# Patient Record
Sex: FEMALE | Employment: FULL TIME | URBAN - METROPOLITAN AREA
[De-identification: names, ages, dates, MRNs, and addresses within clinical notes are randomized per-mention and may not be internally consistent; named-entity substitution may affect disease eponyms.]

---

## 2019-04-01 ENCOUNTER — HOSPITAL ENCOUNTER (OUTPATIENT)
Dept: PHYSICAL THERAPY | Age: 69
End: 2019-04-01
Payer: COMMERCIAL

## 2019-04-29 ENCOUNTER — HOSPITAL ENCOUNTER (OUTPATIENT)
Dept: PHYSICAL THERAPY | Age: 69
Discharge: HOME | End: 2019-04-29
Payer: COMMERCIAL

## 2019-04-29 PROCEDURE — 97162 PT EVAL MOD COMPLEX 30 MIN: CPT

## 2019-04-29 PROCEDURE — 97110 THERAPEUTIC EXERCISES: CPT

## 2019-04-29 NOTE — PROGRESS NOTES
Keesha Marsh 32  [facility] PHYSICAL THERAPY 
[address] [facility] [address], [place], [address] - Phone: [phone]  Fax: [phone] PLAN OF CARE / STATEMENT OF MEDICAL NECESSITY FOR PHYSICAL THERAPY SERVICES Patient Name: Madiha Scott : 1950 Medical  
Diagnosis: Right shoulder pain [M25.511] Treatment Diagnosis: Right shoulder pain Onset Date: DOS:2019 Referral Source: Man[facility]): 2019 Prior Hospitalization: See medical history Provider #: [id number] Prior Level of Function: Independent, sedentary Comorbidities: DM, HTN Medications: Verified on Patient Summary List  
The Plan of Care and following information is based on the information from the initial evaluation.  
=========================================================================================== Assessment / key information:  Pt is a 80year old female who presents to PT today with c/o right shoulder pain s/p right RTC repair on 2019. Pt verbalized understanding of her surgical precautions and reports her pain is managed well. Pt right shoulder PROM progressing well with empty end feel noted in all planes as she reaches the protocol ROM restrictions Pt would benefit from skilled PT to progress patient through rehab protocol and facilitate return to PLOF. Physical Therapy Evaluation - Shoulder 
  
Posture: forward held head, rounded shoulders moderate kyphotic posture 
  
C/S screen: WFL in all planes. \"stiffness with right rotation and left sidebend\" 
  
Palpation: Pt TTP along distal RTC attachment. 
  
Scapulohumoral Control / Rhythm: not observed 
  
ROM:   
                                         AROM/PROM 
  Left Right Flexion [facility] NT/90 Extension [facility] NT/20 Scaption/ABD [facility] NT/45 ER @ 0 Degrees [facility] NT/45 ER @ 90 Degrees WFL  NT/NT  
IR @ 90 Degrees T9 NT/NT  
  
- Empty end feel noted with all planes of PROM, mild pain with ER 
  
 Strength:   [x] Unable to assess at this time                                                                          
  L (1-5) R (1-5) Flexors 4-/5 NT Abductors 4-/5 NT External Rotators 4/5 NT Internal Rotators 4/5 NT Extension 4/5 NT  
  
 
=========================================================================================== Eval Complexity: History: MEDIUM  Complexity : 1-2 comorbidities / personal factors will impact the outcome/ POC Exam:MEDIUM Complexity : 3 Standardized tests and measures addressing body structure, function, activity limitation and / or participation in recreation  Presentation: MEDIUM Complexity : Evolving with changing characteristics  Clinical Decision Making:HIGH Complexity : FOTO score of 1- 25 Overall Complexity:MEDIUM 
 
FOTO score: 4: which indicates 96 % of functional disability. Problem List: pain affecting function, decrease ROM, decrease strength, edema affecting function, impaired gait/ balance, decrease ADL/ functional abilitiies, decrease activity tolerance, decrease flexibility/ joint mobility and decrease transfer abilities Treatment Plan may include any combination of the following: Therapeutic exercise, Therapeutic activities, Neuromuscular re-education, Physical agent/modality, Gait/balance training, Manual therapy, Aquatic therapy, Patient education, Self Care training, Functional mobility training, Home safety training and Stair training Patient / Family readiness to learn indicated by: asking questions, trying to perform skills and interest 
Persons(s) to be included in education: patient (P) Barriers to Learning/Limitations: None Measures taken: NA  
Patient Goal (s): Improve motion of the right arm Patient self reported health status: good Rehabilitation Potential: excellent ? Short Term Goals: To be accomplished in  2  weeks: 1. Pt will be compliant with HEP for symptom management at home. 2. Pt will increase right shoulder active assist flexion/abduction ROM to at least 120 deg in order to facilitate [place] ADL's. 
3. Pt will increase right shoulder active assist external rotation ROM to at least 65 deg in order to facilitate self grooming tasks ? Long Term Goals: To be accomplished in  4  weeks: 1. Pt will be independent with HEP at D/C for self management. 2. Pt will increase right shoulder flexion AROM to at least 135 deg in order to return to [facility] functional activities 3. Pt to increase FOTO score to > 49 to indicate improved functional independence. 4. Pt will increase right shoulder ER AROM to at least 80 deg in order to return to self grooming tasks. Frequency / Duration:   Patient to be seen  2-3  times per week for 4  weeks: 
Patient / Caregiver education and instruction: self care, activity modification and exercises Therapist Signature: Paras Fink PT Date: 4/29/2019 Certification Period: 04/29/2019-07/28/2019 Time: 9:19 AM  
=========================================================================================== I certify that the above Physical Therapy Services are being furnished while the patient is under my care. I agree with the treatment plan and certify that this therapy is necessary. Physician Signature:       Date:      Time:  Please sign and return to [employer] or you may fax the signed copy to [phone]. Thank you.

## 2019-04-29 NOTE — PROGRESS NOTES
PHYSICAL THERAPY - DAILY TREATMENT NOTE Patient Name: Luan Quarles        Date: 2019 : 1950   YES Patient  Verified Visit #:   1     Insurance: Payor: Ann Marie Whatley / Plan: [address] PT / Product Type: Commerical / In time: 04:19 Out time: 5:00 Total Treatment Time: 41 Medicare Time Tracking (below) Total Timed Codes (min):  NA 1:1 Treatment Time:  NA  
TREATMENT AREA = Right shoulder pain [M25.511] SUBJECTIVE Pain Level (on 0 to 10 scale):  1  / 10 Medication Changes/New allergies or changes in medical history, any new surgeries or procedures? NO    If yes, update Summary List  
Subjective Functional Status/Changes:  []  No changes reported CC:Right shoulder pain s/p right RTC repair on 2019 NEIDA/HPI:Pt is a RHD female who reports chronic right shoulder pain that significantly increased approx 6 months prior to surgery. Pt denies any clear NEIDA but states 2 different occasions where her dog has jerked her arm. Pt denies any numbness/tingling down right UE. Pt denies any complications post surgery. Pt f/u with MD on 2019 and he stated he was happy with progress thus far. MD D/C sling and cleared pt to drive. Symptoms Pain rating (0-10): Today:1/10 Best:1/10 Worst:1/10 Aggravating factors:wearing a bra,  
 
Relieving factors: Ice 
 
 
Pain/Difficulty with functional activities: 
[x] Yes [] No Reaching [facility] [x] Yes [] No Tucking in shirt 
[x] Yes [] No Lifting milk carton 
[x] Yes [] No Pushing/pulling 
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 OBJECTIVE Physical Therapy Evaluation - Shoulder Posture: forward held head, rounded shoulders moderate kyphotic posture C/S screen: WFL in all planes. \"stiffness with right rotation and left sidebend\" Palpation: Pt TTP along distal RTC attachment. Scapulohumoral Control / Rhythm: not observed ROM:   
 AROM/PROM Left Right Flexion [facility] NT/90 Extension [facility] NT/20 Scaption/ABD [facility] NT/45 ER @ 0 Degrees [facility] NT/45 ER @ 90 Degrees WFL  NT/NT  
IR @ 90 Degrees T9 NT/NT  
 
- Empty end feel noted with all planes of PROM, mild pain with ER Strength:   [x] Unable to assess at this time L (1-5) R (1-5) Flexors 4-/5 NT Abductors 4-/5 NT External Rotators 4/5 NT Internal Rotators 4/5 NT Extension 4/5 NT Optional Tests:  N/A Other Tests / Comments:  
 
Therapeutic Procedures: 
Min Procedure Specifics + Rationale  
n/a [x]  Patient Education (performed throughout session) [x] Review HEP [] Progressed/Changed HEP based on:  
[] proper performance and advancement of Therex/TA [] reduction in pain level   
[] increased functional capacity [] change in directional preference 15 [x] Therapeutic Exercise [x]  See Flowsheet Rationale: increase ROM and increase strength to improve the patients ability to participate in ADL's Other Objective/Functional Measures: 
 
See objective measurements above. PROM billed under TE Post Treatment Pain Level (on 0 to 10) scale:   1  / 10 ASSESSMENT Assessment/Changes in Function:See POC Justification for Eval Code Complexity: Moderate Patient History (low 0, mod 1-2, high 3-4): Moderate: chronic right shoulder pain requiring surgery Examination (low 1-2, mod 3+, high 4+): Moderate: decreased right shoulder A/PROM, decreased strength Clinical Presentation (low: stable/uncomplicated; mod: evolving; high: unstable/unpredictable): Moderate Clinical Decision Making (low , mod 26-74, high 1-25): High: FOTO: 4 [x]  See Plan of Care 
[]  See Progress Note/ Recertification 
[]  See Discharge Summary Patient will continue to benefit from skilled PT services to modify and progress therapeutic interventions, address functional mobility deficits, address ROM deficits, address strength deficits, analyze and address soft tissue restrictions, analyze and cue movement patterns, analyze and modify body mechanics/ergonomics, assess and modify postural abnormalities, address imbalance/dizziness and instruct in home and community integration  to attain remaining goals Progress toward goals / Updated goals: 
See POC PLAN [x]  Upgrade activities as tolerated 
[x]  Update interventions per flow sheet YES Continue plan of care  
[]  Discharge due to :   
[]  Other:   
 
Therapist: Sharlene Mauro DPT Date: 4/29/2019 Time: 9:25 AM  
 
Future Appointments Date Time Provider Heather Villagran 4/29/2019  4:00 PM Marjan Wallis [facility]  
5/2/2019  2:30 PM Danny Agarwal, [facility]  
5/7/2019  3:00 PM Danny Agarwal, [facility]  
5/9/2019  2:30 PM Brooks Bishop, [facility]

## 2019-05-07 ENCOUNTER — APPOINTMENT (OUTPATIENT)
Dept: PHYSICAL THERAPY | Age: 69
End: 2019-05-07
Payer: COMMERCIAL

## 2019-05-09 ENCOUNTER — HOSPITAL ENCOUNTER (OUTPATIENT)
Dept: PHYSICAL THERAPY | Age: 69
Discharge: HOME | End: 2019-05-09
Payer: COMMERCIAL

## 2019-05-09 PROCEDURE — 97110 THERAPEUTIC EXERCISES: CPT

## 2019-05-09 PROCEDURE — 97140 MANUAL THERAPY 1/> REGIONS: CPT

## 2019-05-09 NOTE — PROGRESS NOTES
PHYSICAL THERAPY - DAILY TREATMENT NOTE Patient Name: Eleni John        Date: 2019 : 1950   YES Patient  Verified Visit #:   2     Insurance: Payor: Kelly Corea / Plan: [address] PT / Product Type: Commerical / In time: 1555 Out time: 09 Total Treatment Time: 40 Medicare/BCBS [place] Time Tracking (below) Total Timed Codes (min):  NA 1:1 Treatment Time:  NA  
TREATMENT AREA =  Right shoulder pain [M25.511] SUBJECTIVE Pain Level (on 0 to 10 scale): 1  / 10 Medication Changes/New allergies or changes in medical history, any new surgeries or procedures? NO    If yes, update Summary List  
Subjective Functional Status/Changes:  []  No changes reported \"It's a little sore. \" OBJECTIVE Therapeutic Procedures: 
Min Procedure Specifics + Rationale  
n/a [x]  Patient Education (performed throughout session) [x] Review HEP [] Progressed/Changed HEP based on:  
[] proper performance and advancement of Therex/TA [] reduction in pain level   
[] increased functional capacity [] change in directional preference 25 [x] Therapeutic Exercise [x]  See Flowsheet Rationale: increase ROM and increase strength to improve the patients ability to participate in ADL's   
15 [x]  Manual Therapy 
   
 [] STM/DTM     [] IASTM     [] Scar Massage [] X-friction       [] PNF         [x] PROM [] TDN (see objective; actual needle insertion time not billed)  
[] Soft tissue mobz  
[] Joint mobz: Gr I [] II []  III [] IV[] V[]: 
Right shoulder s/p RTC repair Rationale: decrease pain, increase ROM, increase tissue extensibility, decrease trigger points and increase postural awareness to attain functional use and participation with ADL's Other Objective/Functional Measures: 
 
Right shoulder flexion PROM 130 degrees AAROM initiated per protocol Post Treatment Pain Level (on 0 to 10) scale:   1  / 10 ASSESSMENT Assessment/Changes in Function: Right shoulder ROM progressing within protocol limits to aid in ADLs and household chores 
  
[]  See Progress Note/Recertification Patient will continue to benefit from skilled PT services to analyze, cue, progress, modify, demonstrate, instruct, and address, movement patterns, therapeutic interventions, postural abnormalities, soft tissue restrictions, ROM, strength, functional mobility, body mechanics/ergonomics, and home and community integration to attain remaining goals. Progress toward goals / Updated goals: 
Progressing towards goals within protocol limits; initiated AAROM and isometrics this session PLAN [x]  Upgrade activities as tolerated YES Continue plan of care  
[]  Discharge due to :   
[]  Other:   
 
Therapist: Blanca Ling, PT, DPT Date: 5/9/2019 Time: 4:31 PM  
 
Future Appointments Date Time Provider Heather Villagran 5/14/2019  3:00 PM Chase Aguilar, PT [facility]  
5/16/2019  3:00 PM Trinidad Giles, PT [facility]  
5/21/2019  3:00 PM Jonathan Brown, PT [facility]  
5/23/2019  2:30 PM Jonathan Brown, PT [facility]  
5/28/2019  3:00 PM [facility]

## 2019-05-14 ENCOUNTER — HOSPITAL ENCOUNTER (OUTPATIENT)
Dept: PHYSICAL THERAPY | Age: 69
Discharge: HOME | End: 2019-05-14
Payer: COMMERCIAL

## 2019-05-14 PROCEDURE — 97110 THERAPEUTIC EXERCISES: CPT

## 2019-05-14 PROCEDURE — 97140 MANUAL THERAPY 1/> REGIONS: CPT

## 2019-05-14 NOTE — PROGRESS NOTES
PHYSICAL THERAPY - DAILY TREATMENT NOTE Patient Name: Min Peter        Date: 2019 : 1950   YES Patient  Verified Visit #:   3     Insurance: Payor: Polo Hernández / Plan[facility] / Product Type: Commerical / In time: 2:00 Out time: 3:00 Total Treatment Time: 60 TREATMENT AREA = Right shoulder pain [M25.511] SUBJECTIVE Pain Level (on 0 to 10 scale):  1  / 10 Medication Changes/New allergies or changes in medical history, any new surgeries or procedures? NO    If yes, update Summary List  
Subjective Functional Status/Changes:  []  No changes reported \"I wasn't too sore after last time\" OBJECTIVE Therapeutic Procedures: 
Min Procedure Specifics + Rationale  
n/a [x]  Patient Education (performed throughout session) [x] Review HEP [] Progressed/Changed HEP based on:  
[] proper performance and advancement of Therex/TA [] reduction in pain level   
[] increased functional capacity [] change in directional preference 40 [x] Therapeutic Exercise [x]  See Flowsheet Rationale: increase ROM and increase strength to improve the patients ability to participate in ADL's   
10 [x]  Manual Therapy [] IASTM  (See Table Below) [] TDN (see objective; actual needle insertion time not billed) STM to periscapular mm; [address] G3 GH Distraction; G3-4 inferior/posterior mobs;  PROM all planes; Scapular PNF; Shoulder PNF Patterns; Rhythmic Stabilization. Rationale: decrease pain, increase ROM, increase tissue extensibility, decrease trigger points and increase postural awareness to attain functional use and participation with ADL's 
[x] Skin assessment post-treatment:  [x]intact []redness- no adverse reaction   []redness  adverse reaction: Modality rationale: decrease inflammation, decrease pain, increase tissue extensibility and increase muscle contraction/control to improve the patients ability to perform ADL's with greater ease Min Type Additional Details 10 [x]  Cold Pack [x] post-RADHA []  Ice massage Location:  Right shoulder 
[x] supine              [] prone [x] legs elevated    [] legs flat 
 [] sitting  
[x] Skin assessment post-treatment:  [x]intact [x]redness- no adverse reaction 
     []redness  adverse reaction:  
Other Objective/Functional Measures: Added new therex per flow sheet/protocol. Post Treatment Pain Level (on 0 to 10) scale:   1  / 10 ASSESSMENT Assessment/Changes in Function:  
 
Minor guarding noted with manual, but otherwise performed therex well with minimal complaints Patient will continue to benefit from skilled PT services to modify and progress therapeutic interventions, address functional mobility deficits, address ROM deficits, address strength deficits, analyze and address soft tissue restrictions, analyze and cue movement patterns, analyze and modify body mechanics/ergonomics and instruct in home and community integration  to attain remaining goals Progress toward goals / Updated goals: 
Progressing in protocol with manual and addition of AAROM therex PLAN [x]  Upgrade activities as tolerated 
[x]  Update interventions per flow sheet YES Continue plan of care  
[]  Discharge due to :   
[]  Other:   
 
Therapist: Yudy Colbert \"BJ\" Lorene, MYA, Cert. MDT, Cert. DN, Cert. SMT Date: 5/14/2019 Time: 2:55 PM  
 
Future Appointments Date Time Provider Heather Villagran 5/14/2019  3:00 PM Swapna Fontaine [facility]  
5/16/2019  3:00 PM Swapna Fontaine [facility]  
5/21/2019  3:00 PM Hyacinth Gonzalez [facility]  
5/23/2019  2:30 PM Hyacinth Gonzalez [facility]  
5/28/2019  3:00 PM Mart[facility]

## 2019-05-16 ENCOUNTER — HOSPITAL ENCOUNTER (OUTPATIENT)
Dept: PHYSICAL THERAPY | Age: 69
Discharge: HOME | End: 2019-05-16
Payer: COMMERCIAL

## 2019-05-16 PROCEDURE — 97140 MANUAL THERAPY 1/> REGIONS: CPT

## 2019-05-16 PROCEDURE — 97110 THERAPEUTIC EXERCISES: CPT

## 2019-05-16 NOTE — PROGRESS NOTES
PHYSICAL THERAPY - DAILY TREATMENT NOTE Patient Name: Junior Baca        Date: 2019 : 1950   YES Patient  Verified Visit #:   4     Insurance: Payor: Janett Patel / Plan: [employer] PT / Product Type: Commerical / In time: 2:55 Out time: 3:45 Total Treatment Time: 50 TREATMENT AREA = Right shoulder pain [M25.511] SUBJECTIVE Pain Level (on 0 to 10 scale):  2  / 10 Medication Changes/New allergies or changes in medical history, any new surgeries or procedures? NO    If yes, update Summary List  
Subjective Functional Status/Changes:  []  No changes reported \"I was definitely sore from the new exercises. But I know I need to work it out. \"  
 
  
 
OBJECTIVE Therapeutic Procedures: 
Min Procedure Specifics + Rationale  
n/a [x]  Patient Education (performed throughout session) [x] Review HEP [] Progressed/Changed HEP based on:  
[] proper performance and advancement of Therex/TA [] reduction in pain level   
[] increased functional capacity [] change in directional preference 40 [x] Therapeutic Exercise [x]  See Flowsheet Rationale: increase ROM and increase strength to improve the patients ability to participate in ADL's   
10 [x]  Manual Therapy [] IASTM  (See Table Below) [] TDN (see objective; actual needle insertion time not billed) STM to periscapular mm; [address] G3 GH Distraction; G3-4 inferior/posterior mobs;  PROM all planes; Scapular PNF; Shoulder PNF Patterns; Rhythmic Stabilization. Rationale: decrease pain, increase ROM, increase tissue extensibility, decrease trigger points and increase postural awareness to attain functional use and participation with ADL's 
[x] Skin assessment post-treatment:  [x]intact []redness- no adverse reaction   []redness  adverse reaction:   
Other Objective/Functional Measures: 
 
VC's with positioning with [place] ER in standing and supine. Post Treatment Pain Level (on 0 to 10) scale:   0  / 10 ASSESSMENT Assessment/Changes in Function:  
 
Improved relaxation/reduced guarding with manual.   
  
 
Patient will continue to benefit from skilled PT services to modify and progress therapeutic interventions, address functional mobility deficits, address ROM deficits, address strength deficits, analyze and address soft tissue restrictions, analyze and cue movement patterns, analyze and modify body mechanics/ergonomics and instruct in home and community integration  to attain remaining goals Progress toward goals / Updated goals: 
Steady improvement in AAROM flexion. PLAN [x]  Upgrade activities as tolerated 
[x]  Update interventions per flow sheet YES Continue plan of care  
[]  Discharge due to :   
[]  Other:   
 
Therapist: Keli Padilla \"BJ\" MYA Paulson, Cert. MDT, Cert. DN, Cert. SMT Date: 5/16/2019 Time: 3:01 PM  
 
Future Appointments Date Time Provider Heather Villagran 5/21/2019  3:00 PM Amari Olson PT [facility]  
5/23/2019  2:30 PM Amari Olson PT [facility]  
5/28/2019  3:00 PM Hina Lebron [facility]

## 2019-05-21 ENCOUNTER — HOSPITAL ENCOUNTER (OUTPATIENT)
Dept: PHYSICAL THERAPY | Age: 69
Discharge: HOME | End: 2019-05-21
Payer: COMMERCIAL

## 2019-05-21 PROCEDURE — 97110 THERAPEUTIC EXERCISES: CPT

## 2019-05-21 NOTE — PROGRESS NOTES
PHYSICAL THERAPY - DAILY TREATMENT NOTE    Patient Name: Kareen Hutchinson        Date: 2019  : 1950   YES Patient  Verified  Visit #:   5     Insurance: Payor: Lina Florez / Plan: [address] [facility] [address] PT / Product Type: Commerical /      In time: 3:00 Out time: 3:35   Total Treatment Time: 35     Medicare/BCBS [place] Time Tracking (below)   Total Timed Codes (min):  NA 1:1 Treatment Time:  NA     TREATMENT AREA =  S/P right RTC    SUBJECTIVE    Pain Level (on 0 to 10 scale):  0  / 10   Medication Changes/New allergies or changes in medical history, any new surgeries or procedures? NO    If yes, update Summary List   Subjective Functional Status/Changes:  []  No changes reported     \"I thought I might have done something the other day. I went to swat at the cat clawing at my bed and i thought I pulled something\"           OBJECTIVE    25 min Therapeutic Exercise:  [x]  See flow sheet   Rationale:      increase ROM and increase strength to improve the patients ability to prepare for AROM     10 min Manual Therapy: PROM all planes, scap mobs, periscapular framing    Rationale:      decrease pain, increase ROM and increase tissue extensibility to improve patient's ability to perform ADLs     min Patient Education:  YES  Reviewed HEP   []  Progressed/Changed HEP based on:   Cont HEP     Other Objective/Functional Measures: Added table slide flexion and ER at 45deg abd     Post Treatment Pain Level (on 0 to 10) scale:   0  / 10     ASSESSMENT    Assessment/Changes in Function:     Reassess NV     []  See Progress Note/Recertification   Patient will continue to benefit from skilled PT services to analyze, cue, progress, modify,, demonstrate, instruct, and address, movement patterns, therapeutic interventions, postural abnormalities, soft tissue restrictions, ROM, strength, functional mobility, body mechanics/ergonomics, and home and community integration, to attain remaining goals.    Progress toward goals / Updated goals:    Reassess NV for MD note     PLAN    []  Upgrade activities as tolerated YES Continue plan of care   []  Discharge due to :    []  Other: Add AROM NV     Therapist: Loren Bah DPT    Date: 5/21/2019 Time: 3:06 PM     Future Appointments   Date Time Provider Heather Villagran   5/23/2019  2:30 PM Jalyn Skinner PT [facility]   5/28/2019  3:00 PM Blas Williamson [facility]

## 2019-05-23 ENCOUNTER — APPOINTMENT (OUTPATIENT)
Dept: PHYSICAL THERAPY | Age: 69
End: 2019-05-23
Payer: COMMERCIAL

## 2019-05-28 ENCOUNTER — HOSPITAL ENCOUNTER (OUTPATIENT)
Dept: PHYSICAL THERAPY | Age: 69
Discharge: HOME | End: 2019-05-28
Payer: COMMERCIAL

## 2019-05-28 PROCEDURE — 97110 THERAPEUTIC EXERCISES: CPT

## 2019-05-28 NOTE — PROGRESS NOTES
PHYSICAL THERAPY - DAILY TREATMENT NOTE    Patient Name: Justice Abdi        Date: 2019  : 1950   Yes Patient  Verified  Visit #:     Insurance: Payor: Josie Parham / Plan: [address] PT / Product Type: Commerical /      In time: 300 Out time: 341   Total Treatment Time: 41     Medicare/BCBS Time Tracking (below)   Total Timed Codes (min):  NA 1:1 Treatment Time:  NA     TREATMENT AREA =  Right shoulder pain [M25.511]    SUBJECTIVE    Pain Level (on 0 to 10 scale):  0  / 10   Medication Changes/New allergies or changes in medical history, any new surgeries or procedures? NO    If yes, update Summary List   Subjective Functional Status/Changes:  []  No changes reported     Pt reports that she has not been completely compliant with HEP and accidentally put weight on surgical limb. DPT educated pt that she would need more therapy after she question whether to schedule more sessions.         OBJECTIVE  Therapeutic Procedures:  Min Procedure Specifics + Rationale   (41) [x] Therapeutic Exercise    See Flowsheet   Rationale: increase ROM and increase strength to improve the patients ability to participate in ADL's              min Patient Education:  YES Reviewed HEP         Other Objective/Functional Measures:    See flowsheet for more details    Mod VC and demos required throughout session for proper form and increased safety                                     AAROM/PROM    Right   Flexion 125/140   Extension 48/50   Scaption/ABD 82/95   ER @ 90 Degrees  42/62   IR @ 90 Degrees 42/50     Progressed therex per flowsheet     Post Treatment Pain Level (on 0 to 10) scale:   1  / 10     ASSESSMENT    Assessment/Changes in Function:     See PN     []  See Progress Note/Recertification   Patient will continue to benefit from skilled PT services to analyze,, cue,, progress,, modify,, demonstrate,, instruct, and address, movement patterns,, therapeutic interventions,, postural abnormalities,, soft tissue restrictions,, ROM,, strength,, functional mobility,, body mechanics/ergonomics, and home and community integration, to attain remaining goals. Progress toward goals / Updated goals:    1. Pt will be compliant with HEP for symptom management at home. NOT MET: Infrequent Compliance  2. Pt will increase right shoulder active assist flexion/abduction ROM to at least 120 deg in order to facilitate [place] ADL's. MET: 125 deg  3. Pt will increase right shoulder active assist external rotation ROM to at least 65 deg in order to facilitate self grooming tasks  NOT MET: 42 deg     PLAN    [x]  Upgrade activities as tolerated YES Continue plan of care   []  Discharge due to :    []  Other:      Therapist: Rosalina Aguirre DPT    Date: 5/28/2019 Time: 3:01 PM   No future appointments.

## 2019-05-29 NOTE — PROGRESS NOTES
[place]  [facility] PHYSICAL THERAPY  [address], [address] - Phone: [phone]  Fax: [phone]  Progress Note/CONTINUED [address] for PHYSICAL THERAPY          Patient Name: Janette Wray : 1950   Treatment/Medical Diagnosis: Right shoulder pain [M25.511]   Referral Source: Liv Skinner* Start of Care [facility]): 2019   Prior Hospitalization: See Medical History Provider #: [id number]   Medications: Verified on Patient Summary List   Visits from [facility]: 5 Missed Visits: 0     GOALS  1. Pt will be compliant with HEP for symptom management at home. NOT MET: Infrequent Compliance  2. Pt will increase right shoulder active assist flexion/abduction ROM to at least 120 deg in order to facilitate [place] ADL's. MET: 125 deg of flexion  3. Pt will increase right shoulder active assist external rotation ROM to at least 65 deg in order to facilitate self grooming tasks  NOT MET: 42 deg    SUMMARY OF TREATMENT  Patient's POC has consisted of therex, therapeutic activities, manual therapy prn, modalities prn, pt. education, and a comprehensive HEP. Treatment strategies used to address functional mobility deficits, ROM deficits, strength deficits, analyze and address soft tissue restrictions, analyze and cue movement patterns, analyze and modify body mechanics/ergonomics, assess and modify postural abnormalities and instruct in home and community integration. Key Functional Changes/Progress: Marcial Cervantes has completed 5 follow up session since intial evaluation and is progressing well per protocol. Her PROM/AAROM is improving and she has good tolerance to treatment. Her pain levels are routinely below 2/10 and she is motivated during session.  Her ROM is as follows:                                     AAROM/PROM    Right   Flexion 125/140   Extension 48/50   Scaption/ABD 82/95   ER @ 90 Degrees  42/62   IR @ 90 Degrees 42/50      Patient Goal(s) has been updated and includes:  Same as [facility]    Goals for this certification period include and are to be achieved in   6  weeks  1. Pt will be compliant with HEP for symptom management at home. 2. Pt will increase right shoulder active assist external rotation ROM to at least 65 deg in order to facilitate self grooming tasks  3. Pt will be independent with HEP at D/C for self management. 4. Pt will increase right shoulder flexion AROM to at least 135 deg in order to return to [facility] functional activities  5. Pt to increase FOTO score to > 49 to indicate improved functional independence. 6. Pt will increase right shoulder ER AROM to at least 80 deg in order to return to self grooming tasks. Frequency / Duration:   Patient to be seen   2-3   times per week for   6    weeks:  Assessments/Recommendations: Continue to progress patient through protocol provided by MD.   If you have any questions/comments please contact us directly at [phone]. Thank you for allowing us to assist in the care of your patient. Therapist Signature: Tien Lauren DPT Date: 6/87/5960   Certification Period:  Reporting Period: NA  NA Time: 9:13 AM   NOTE TO PHYSICIAN:  PLEASE COMPLETE THE ORDERS BELOW AND FAX TO   [facility] Physical Therapy: [phone]. If you are unable to process this request in 24 hours please contact our office: [phone].    ___ I have read the above report and request that my patient continue as recommended.   ___ I have read the above report and request that my patient continue therapy with the following changes/special instructions: ________________________________________________   ___ I have read the above report and request that my patient be discharged from therapy.      Physician Signature:        Date:       Time:

## 2019-06-04 ENCOUNTER — HOSPITAL ENCOUNTER (OUTPATIENT)
Dept: PHYSICAL THERAPY | Age: 69
End: 2019-06-04
Payer: COMMERCIAL

## 2019-06-11 ENCOUNTER — HOSPITAL ENCOUNTER (OUTPATIENT)
Dept: PHYSICAL THERAPY | Age: 69
Discharge: HOME | End: 2019-06-11
Payer: COMMERCIAL

## 2019-06-11 PROCEDURE — 97110 THERAPEUTIC EXERCISES: CPT

## 2019-06-11 PROCEDURE — 97140 MANUAL THERAPY 1/> REGIONS: CPT

## 2019-06-11 NOTE — PROGRESS NOTES
PHYSICAL THERAPY - DAILY TREATMENT NOTE    Patient Name: Gregory Palma        Date: 2019  : 1950   YES Patient  Verified  Visit #:   7     Insurance: Payor: Ritu Screws / Plan: [facility] [employer]  [facility] / Product Type: Commerical /      In time: 304 Out time: 341   Total Treatment Time: 37     TREATMENT AREA = Right shoulder pain [M25.511]    SUBJECTIVE    Pain Level (on 0 to 10 scale):  1  / 10   Medication Changes/New allergies or changes in medical history, any new surgeries or procedures? 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 I know this is a slow process but I can't wait to get back to using my arm fully. \"  When asked if compliant with HEP she said \"some\". OBJECTIVE  Therapeutic Procedures:  Min Procedure Specifics + Rationale   n/a [x]  Patient Education (performed throughout session) [x] Review HEP    [] Progressed/Changed HEP based on:   [] proper performance and advancement of Therex/TA   [] reduction in pain level    [] increased functional capacity       [] change in directional preference   29 [x] Therapeutic Exercise   [x]  See Flowsheet   Rationale: increase ROM and increase strength to improve the patients ability to participate in ADL's    8 [x]  Manual Therapy (PROM) Rationale: decrease pain, increase ROM, increase tissue extensibility, decrease trigger points and increase postural awareness to attain functional use and participation with ADL's  [x] Skin assessment post-treatment:  [x]intact []redness- no adverse reaction   []redness  adverse reaction:        Modality rationale: decrease inflammation, decrease pain, increase tissue extensibility and increase muscle contraction/control to improve the patients ability to perform ADL's with greater ease       Other Objective/Functional Measures:    Cont.  AROM exercises per protocol. Post Treatment Pain Level (on 0 to 10) scale:   0  / 10     ASSESSMENT    Assessment/Changes in Function:     Pt endured AROM well w/o complaints of pain during ER in sidelying. Patient will continue to benefit from skilled PT services to modify and progress therapeutic interventions, address functional mobility deficits, address ROM deficits, address strength deficits, analyze and address soft tissue restrictions, analyze and cue movement patterns, analyze and modify body mechanics/ergonomics, assess and modify postural abnormalities and instruct in home and community integration  to attain remaining goals   Progress toward goals / Updated goals:    1st session since last PN, no significant progress in function. PLAN    [x]  Upgrade activities as tolerated  [x]  Update interventions per flow sheet YES Continue plan of care   []  Discharge due to :    []  Other:      Therapist: Miranda Blum \"TIMBO\" MYA Paulson, Cert. MDT, Cert. DN, Cert.  SMT  Patito Olivarez, [facility]    Date: 6/11/2019 Time: 3:08 PM     Future Appointments   Date Time Provider Heather Villagran   6/13/2019  3:00 PM Kady Vazquez, PT [facility]   6/18/2019  3:00 PM Mayda Cosby PT [facility]   6/20/2019  3:00 PM Kady Vazquez, PT [facility]   6/25/2019  3:00 PM Kady Vazquez, PT [facility]   6/27/2019  3:00 PM Kady Vazquez, PT [facility]

## 2019-06-13 ENCOUNTER — HOSPITAL ENCOUNTER (OUTPATIENT)
Dept: PHYSICAL THERAPY | Age: 69
Discharge: HOME | End: 2019-06-13
Payer: COMMERCIAL

## 2019-06-13 PROCEDURE — 97140 MANUAL THERAPY 1/> REGIONS: CPT

## 2019-06-13 PROCEDURE — 97110 THERAPEUTIC EXERCISES: CPT

## 2019-06-13 NOTE — PROGRESS NOTES
PHYSICAL THERAPY - DAILY TREATMENT NOTE    Patient Name: Сергей Stokes        Date: 2019  : 1950   YES Patient  Verified  Visit #:   8     Insurance: Payor: Minnie Vasquez / Plan: [address] [place] [address] [facility] / Product Type: Commerical /      In time: 3:01 Out time: 3:40   Total Treatment Time: 39     TREATMENT AREA = Right shoulder pain [M25.511]    SUBJECTIVE    Pain Level (on 0 to 10 scale):  1  / 10   Medication Changes/New allergies or changes in medical history, any new surgeries or procedures? NO    If yes, update Summary List   Subjective Functional Status/Changes:  []  No changes reported     \"I always feel a dull ache but nothing bad. I try to do more with my arm but I haven't gotten used to and I feel stiff. \"          OBJECTIVE  Therapeutic Procedures:  Min Procedure Specifics + Rationale   n/a [x]  Patient Education (performed throughout session) [x] Review HEP    [] Progressed/Changed HEP based on:   [] proper performance and advancement of Therex/TA   [] reduction in pain level    [] increased functional capacity       [] change in directional preference   31 [x] Therapeutic Exercise   [x]  See Flowsheet   Rationale: increase ROM and increase strength to improve the patients ability to participate in ADL's    8 [x]  Manual Therapy PROM and Distraction of shoulder  Rationale: decrease pain, increase ROM, increase tissue extensibility, decrease trigger points and increase postural awareness to attain functional use and participation with ADL's  [x] Skin assessment post-treatment:  [x]intact []redness- no adverse reaction   []redness  adverse reaction:        Modality rationale: decrease inflammation, decrease pain, increase tissue extensibility and increase muscle contraction/control to improve the patients ability to perform ADL's with greater ease       Other Objective/Functional Measures:    PT added TB shoulder AROM, wall wash, UBE, and scap stabs continuing with protocol, per flowsheet. Pt able to abolish pain in shoulder after manual stretching. Pt also complained of stiffness in trap muscle. Post Treatment Pain Level (on 0 to 10) scale:   0  / 10     ASSESSMENT    Assessment/Changes in Function:     Pt enduring increase in AROM well w/o c/o pain during new TB activities. Pt able to obtain more PROM using contract/relax technique to reduce muscle guarding. Patient will continue to benefit from skilled PT services to modify and progress therapeutic interventions, address functional mobility deficits, address ROM deficits, address strength deficits, analyze and address soft tissue restrictions, analyze and cue movement patterns, analyze and modify body mechanics/ergonomics and assess and modify postural abnormalities  to attain remaining goals   Progress toward goals / Updated goals:    Pt progressing well with addition of new therex. PLAN    [x]  Upgrade activities as tolerated  [x]  Update interventions per flow sheet YES Continue plan of care   []  Discharge due to :    []  Other:      Therapist: Val Willingham \"BJ\" MYA Paulson, Cert. MDT, Cert. DN, Cert.  SMT  Judy Austin, SPT    Date: 6/13/2019 Time: 3:38 PM     Future Appointments   Date Time Provider Heather Villagran   6/18/2019  3:00 PM Harley Sanabria, PT [facility]   6/20/2019  3:00 PM Lashonda Parekh PT [facility]   6/25/2019  3:00 PM Lashonda Parekh PT [facility]   6/27/2019  3:00 PM Lashonda Parekh, PT [facility]

## 2019-06-18 ENCOUNTER — APPOINTMENT (OUTPATIENT)
Dept: PHYSICAL THERAPY | Age: 69
End: 2019-06-18
Payer: COMMERCIAL

## 2019-06-20 ENCOUNTER — HOSPITAL ENCOUNTER (OUTPATIENT)
Dept: PHYSICAL THERAPY | Age: 69
Discharge: HOME | End: 2019-06-20
Payer: COMMERCIAL

## 2019-06-20 PROCEDURE — 97110 THERAPEUTIC EXERCISES: CPT

## 2019-06-20 NOTE — PROGRESS NOTES
PHYSICAL THERAPY - DAILY TREATMENT NOTE    Patient Name: Min Peetr        Date: 2019  : 1950   YES Patient  Verified  Visit #:   +  Insurance: Payor: Polo Hernández / Plan: [address] [facility] / Product Type: Commerical /      In time: 3:00 Out time: 3:56   Total Treatment Time: 56     TREATMENT AREA = Right shoulder pain [M25.511]    SUBJECTIVE    Pain Level (on 0 to 10 scale):  4  / 10   Medication Changes/New allergies or changes in medical history, any new surgeries or procedures? NO    If yes, update Summary List   Subjective Functional Status/Changes:  []  No changes reported     \"I think I'm more sore b/c I'm doing more at home. I also got back into the habit of sleeping with my arm up. \"          OBJECTIVE  Therapeutic Procedures:  Min Procedure Specifics + Rationale   n/a [x]  Patient Education (performed throughout session) [x] Review HEP    [] Progressed/Changed HEP based on:   [] proper performance and advancement of Therex/TA   [] reduction in pain level    [] increased functional capacity       [] change in directional preference   46 [x] Therapeutic Exercise   [x]  See Flowsheet   Rationale: increase ROM and increase strength to improve the patients ability to participate in ADL's        Modality rationale: decrease inflammation, decrease pain, increase tissue extensibility and increase muscle contraction/control to improve the patients ability to perform ADL's with greater ease     Min Type Additional Details   10 [x]  Cold Pack       [x] post-RADHA  []  Ice massage Location:  shoulder  [] supine              [] prone  [] legs elevated    [] legs flat   [] sitting   [x] Skin assessment post-treatment:  [x]intact [x]redness- no adverse reaction       []redness  adverse reaction:     Other Objective/Functional Measures:    Increased reps/sets/resistance per flow sheet. Added new therex to further promote stability per flow sheet.       Post Treatment Pain Level (on 0 to 10) scale:   4\"sore\"  / 10     ASSESSMENT    Assessment/Changes in Function:     Deconditioning present to triceps and biceps. Patient will continue to benefit from skilled PT services to modify and progress therapeutic interventions, address functional mobility deficits, address ROM deficits, address strength deficits, analyze and address soft tissue restrictions, analyze and cue movement patterns and instruct in home and community integration  to attain remaining goals   Progress toward goals / Updated goals: Increased ADL performance but deconditioned      PLAN    [x]  Upgrade activities as tolerated  [x]  Update interventions per flow sheet YES Continue plan of care   []  Discharge due to :    []  Other:      Therapist: Sariah Olmos \"BJ\" MYA Paulson, Cert. MDT, Cert. DN, Cert.  SMT    Date: 6/20/2019 Time: 4:26 PM     Future Appointments   Date Time Provider Heather Villagran   6/25/2019  3:00 PM Luther Bui PT [facility]   6/27/2019  3:00 PM Luther Bui PT [facility]

## 2019-06-25 ENCOUNTER — HOSPITAL ENCOUNTER (OUTPATIENT)
Dept: PHYSICAL THERAPY | Age: 69
Discharge: HOME | End: 2019-06-25
Payer: COMMERCIAL

## 2019-06-25 PROCEDURE — 97530 THERAPEUTIC ACTIVITIES: CPT

## 2019-06-25 PROCEDURE — 97110 THERAPEUTIC EXERCISES: CPT

## 2019-06-25 NOTE — PROGRESS NOTES
[address] [facility] PHYSICAL THERAPY  [address] [facility] [address], [address] - Phone: [phone]  Fax: [phone]  PROGRESS NOTE  Patient Name: Junior Baca : 1950   Treatment/Medical Diagnosis: Right shoulder pain [M25.511]   Referral Source: Trenia Signs*     Date of Initial Visit: 19 Attended Visits: 10 Missed Visits: 2     SUMMARY OF TREATMENT  Patient's POC has consisted of therex, therapeutic activities, manual therapy prn, modalities prn, pt. education, and a comprehensive HEP. Treatment strategies used to address functional mobility deficits, ROM deficits, strength deficits, analyze and address soft tissue restrictions, analyze and cue movement patterns, analyze and modify body mechanics/ergonomics, assess and modify postural abnormalities and instruct in home and community integration. CURRENT STATUS  Patient has only attended 10 sessions of PT since her evaluation date. Patient has transportation difficulties as she is reliant on her granddaughter to bring her to PT. She reports minimal discomfort overall, but continues with limitations in her ROM, to which she demonstrates as significant amount of compensatory movements. Current ER = 50 degrees, IR HBB to S1. Flexion = 90 degrees. Abduction = 90 degrees (PROM 110 degrees)    Goal/Measure of Progress Goal Met? 1. Pt will be compliant with HEP for symptom management at home. 2. Pt will increase right shoulder active assist external rotation ROM to at least 65 deg in order to facilitate self grooming tasks  3. Pt will be independent with HEP at D/C for self management. 4. Pt will increase right shoulder flexion AROM to at least 135 deg in order to return to [facility] functional activities  5. Pt to increase FOTO score to > 49 to indicate improved functional independence.   6. Pt will increase right shoulder ER AROM to at least 80 deg in order to return to self grooming tasks.  MET    No      Ongoing    NO          NO               New Goals to be achieved in __2-4__  weeks:  1. Pt will increase right shoulder active assist external rotation ROM to at least 65 deg in order to facilitate self grooming tasks  2. Pt will be independent with HEP at D/C for self management. 3. Pt will increase right shoulder flexion AROM to at least 135 deg in order to return to [facility] functional activities  4. Pt to increase FOTO score to > 49 to indicate improved functional independence. 5. Pt will increase right shoulder ER AROM to at least 80 deg in order to return to self grooming tasks. Frequency / Duration:   Patient to be seen  2  times per week for 2-4  weeks:    RECOMMENDATIONS  Continue and progress functional therex/therapeutic activity as able, utilizing manual therapy and modalities prn. Progress patient towards independent HEP to facilitate self-management of symptoms and progress gains after PT. If you have any questions/comments please contact us directly at [phone]. Thank you for allowing us to assist in the care of your patient. Therapist Signature: Roel Mary \"TIMBO\" Dennie Mews, MYA, Cert. MDT, Cert. DN, Cert. SMT Date: 6/34/9079   Certification Period: n/a     Reporting Period n/a   Time: 3:22 PM   NOTE TO PHYSICIAN:  PLEASE COMPLETE THE ORDERS BELOW AND FAX TO   [facility] Physical Therapy: [phone]. If you are unable to process this request in 24 hours please contact our office: [phone].    ___ I have read the above report and request that my patient continue as recommended.   ___ I have read the above report and request that my patient continue therapy with the following changes/special instructions:_________________________________________________________   ___ I have read the above report and request that my patient be discharged from therapy.      Physician Signature:        Date:       Time:

## 2019-06-25 NOTE — PROGRESS NOTES
PHYSICAL THERAPY - DAILY TREATMENT NOTE    Patient Name: Kareen Hutchinson        Date: 2019  : 1950   YES Patient  Verified  Visit #:   10   of   12+  Insurance: Payor: Lina Vikki / [facility] [address] [facility] / Product Type: Commerical /      In time: 3:05 Out time: 4:00   Total Treatment Time: 55     Medicare/BCBS Time Tracking (below)   Total Timed Codes (min):  n/a 1:1 Treatment Time:  n/a     TREATMENT AREA = Right shoulder pain [M25.511]    SUBJECTIVE    Pain Level (on 0 to 10 scale):  3  / 10   Medication Changes/New allergies or changes in medical history, any new surgeries or procedures? NO    If yes, update Summary List   Subjective Functional Status/Changes:  []  No changes reported     \"I just woke up. I did get sore from the new stuff we were doing. Not constant though. Some pain here when I move a certain way\" (points to anterior region of shoulder to pectoral region.)         OBJECTIVE     Therapeutic Procedures:  Min Procedure Specifics + Rationale   n/a [x]  Patient Education (performed throughout session) [x] Review HEP    [] Progressed/Changed HEP based on:   [] proper performance and advancement of Therex/TA   [] reduction in pain level    [] increased functional capacity       [] change in directional preference   45(45) [x] Therapeutic Exercise   [x]  See Flowsheet   Rationale: increase ROM and increase strength to improve the patients ability to participate in ADL's    10(10) [x] Therapeutic Activity   [x]  See Flowsheet  Re-assessment  Rationale:  To improve safety, proprioception, coordination, and efficiency with tasks       Other Objective/Functional Measures:    See PN     Post Treatment Pain Level (on 0 to 10) scale:   0  / 10     ASSESSMENT    Assessment/Changes in Function:       See PN         Patient will continue to benefit from skilled PT services to modify and progress therapeutic interventions, address functional mobility deficits, address ROM deficits, address strength deficits, analyze and address soft tissue restrictions, analyze and cue movement patterns and instruct in home and community integration  to attain remaining goals   Progress toward goals / Updated goals:    See PN     PLAN    [x]  Upgrade activities as tolerated  [x]  Update interventions per flow sheet YES Continue plan of care   []  Discharge due to :    []  Other:      Therapist: Iker De La Rosa \"BJ\" MYA Paulson, Cert. MDT, Cert. DN, Cert.  SMT    Date: 6/25/2019 Time: 2:57 PM     Future Appointments   Date Time Provider Heather Villagran   6/25/2019  3:00 PM Ruchi East PT [facility]   6/27/2019  3:00 PM Ruchi East PT [facility]

## 2019-06-27 ENCOUNTER — HOSPITAL ENCOUNTER (OUTPATIENT)
Dept: PHYSICAL THERAPY | Age: 69
Discharge: HOME | End: 2019-06-27
Payer: COMMERCIAL

## 2019-06-27 PROCEDURE — 97110 THERAPEUTIC EXERCISES: CPT

## 2019-06-27 PROCEDURE — 97140 MANUAL THERAPY 1/> REGIONS: CPT

## 2019-06-27 NOTE — PROGRESS NOTES
PHYSICAL THERAPY - DAILY TREATMENT NOTE    Patient Name: Dayne Luke        Date: 2019  : 1950   YES Patient  Verified  Visit #:     Insurance: Payor: Rafi Lawrence / Plan: [address] [facility] / Product Type: Commerical /      In time: 3:00 Out time: 3:40   Total Treatment Time: 40     TREATMENT AREA = Right shoulder pain [M25.511]    SUBJECTIVE    Pain Level (on 0 to 10 scale):  0  / 10   Medication Changes/New allergies or changes in medical history, any new surgeries or procedures? NO    If yes, update Summary List   Subjective Functional Status/Changes:  []  No changes reported     \"I'm doing ok. I'm just concerned the insurance won't approve more after next week\"          OBJECTIVE  Therapeutic Procedures:  Min Procedure Specifics + Rationale   n/a [x]  Patient Education (performed throughout session) [x] Review HEP    [] Progressed/Changed HEP based on:   [] proper performance and advancement of Therex/TA   [] reduction in pain level    [] increased functional capacity       [] change in directional preference   38 [x] Therapeutic Exercise   [x]  See Flowsheet   Rationale: increase ROM and increase strength to improve the patients ability to participate in ADL's    8 [x]  Manual Therapy [] IASTM  (See Table Below)  [] TDN (see objective; actual needle insertion time not billed)   Seated posterior capsule mobilization, inferior glide/posterior glide  ER @ variations of elevation OP/mobilization  Rationale: decrease pain, increase ROM, increase tissue extensibility, decrease trigger points and increase postural awareness to attain functional use and participation with ADL's  [x] Skin assessment post-treatment:  [x]intact []redness- no adverse reaction   []redness  adverse reaction:            Other Objective/Functional Measures:    therex revised per flow sheet.       Post Treatment Pain Level (on 0 to 10) scale:   0  / 10     ASSESSMENT    Assessment/Changes in Function: Limitations in ROM vs gravity. Patient will continue to benefit from skilled PT services to modify and progress therapeutic interventions, address functional mobility deficits, address ROM deficits, address strength deficits, analyze and address soft tissue restrictions, analyze and cue movement patterns, analyze and modify body mechanics/ergonomics and instruct in home and community integration  to attain remaining goals   Progress toward goals / Updated goals:    1st session since progress note. No significant progress observed       PLAN    [x]  Upgrade activities as tolerated  [x]  Update interventions per flow sheet YES Continue plan of care   []  Discharge due to :    []  Other:      Therapist: Brandy Calle \"BJ\" MYA Paulson, Cert. MDT, CertGómez JACKSON, Cert. SMT    Date: 6/27/2019 Time: 3:37 PM   No future appointments.

## 2019-07-02 ENCOUNTER — HOSPITAL ENCOUNTER (OUTPATIENT)
Dept: PHYSICAL THERAPY | Age: 69
Discharge: HOME | End: 2019-07-02
Payer: COMMERCIAL

## 2019-07-02 PROCEDURE — 97110 THERAPEUTIC EXERCISES: CPT

## 2019-07-02 NOTE — PROGRESS NOTES
PHYSICAL THERAPY - DAILY TREATMENT NOTE  2Patient Name: Janna Neil        Date: 2019  : 1950   YES Patient  Verified  Visit #:     Insurance: Payor: Dora Feeler / Plan: [address] [facility] [address] PT / Product Type: Commerical /      In time: 3:55 Out time: 4:48   Total Treatment Time: 53     TREATMENT AREA = Right shoulder pain [M25.511]    SUBJECTIVE    Pain Level (on 0 to 10 scale):  0  / 10   Medication Changes/New allergies or changes in medical history, any new surgeries or procedures? NO    If yes, update Summary List   Subjective Functional Status/Changes:  []  No changes reported     \"I'm doing ok, just weak. \"          OBJECTIVE  Therapeutic Procedures:  Min Procedure Specifics + Rationale   n/a [x]  Patient Education (performed throughout session) [x] Review HEP    [] Progressed/Changed HEP based on:   [] proper performance and advancement of Therex/TA   [] reduction in pain level    [] increased functional capacity       [] change in directional preference   53 [x] Therapeutic Exercise   [x]  See Flowsheet   Rationale: increase ROM and increase strength to improve the patients ability to participate in ADL's            Other Objective/Functional Measures: Added posterior capsule stretch  Added hammock stretch     Post Treatment Pain Level (on 0 to 10) scale:   0  / 10     ASSESSMENT    Assessment/Changes in Function:     Continued limitation with flexion concentrically, but able to lower eccentrically from finger ladder with fair control.          Patient will continue to benefit from skilled PT services to modify and progress therapeutic interventions, address functional mobility deficits, address ROM deficits, address strength deficits, analyze and address soft tissue restrictions, analyze and cue movement patterns, analyze and modify body mechanics/ergonomics and instruct in home and community integration  to attain remaining goals   Progress toward goals / Updated goals:    1st session since progress note. No significant progress observed       PLAN    [x]  Upgrade activities as tolerated  [x]  Update interventions per flow sheet YES Continue plan of care   []  Discharge due to :    []  Other:      Therapist: Vivian Schulte \"TIMBO\" MYA Paulson, Cert. MDT, Cert. DN, Cert. SMT    Date: 7/2/2019 Time: 4:36 PM   No future appointments.

## 2019-07-10 ENCOUNTER — APPOINTMENT (OUTPATIENT)
Dept: PHYSICAL THERAPY | Age: 69
End: 2019-07-10
Payer: COMMERCIAL

## 2019-07-15 ENCOUNTER — HOSPITAL ENCOUNTER (OUTPATIENT)
Dept: PHYSICAL THERAPY | Age: 69
End: 2019-07-15
Payer: COMMERCIAL

## 2019-07-22 ENCOUNTER — APPOINTMENT (OUTPATIENT)
Dept: PHYSICAL THERAPY | Age: 69
End: 2019-07-22
Payer: COMMERCIAL

## 2019-07-25 ENCOUNTER — APPOINTMENT (OUTPATIENT)
Dept: PHYSICAL THERAPY | Age: 69
End: 2019-07-25
Payer: COMMERCIAL

## 2019-07-29 ENCOUNTER — APPOINTMENT (OUTPATIENT)
Dept: PHYSICAL THERAPY | Age: 69
End: 2019-07-29
Payer: COMMERCIAL

## 2019-07-30 ENCOUNTER — APPOINTMENT (OUTPATIENT)
Dept: PHYSICAL THERAPY | Age: 69
End: 2019-07-30
Payer: COMMERCIAL

## 2019-08-02 ENCOUNTER — APPOINTMENT (OUTPATIENT)
Dept: PHYSICAL THERAPY | Age: 69
End: 2019-08-02

## 2019-08-06 NOTE — PROGRESS NOTES
[address] [facility] PHYSICAL THERAPY  [address] [facility] [place], [address] - Phone: [phone]  Fax: [phone]      Dear Dr. Erickson Haddad*,   Per your referral, Kevin Tellez was evaluated and treated for the diagnosis of Right shoulder pain [M25.511]. The patient only attended 2 more sessions since her last PN on 6/25/19. She missed 4 more sessions (total of 6), and had not been seen since 7/2/19. She has not communicated with us her intentions regarding PT. We attempted to have her attend one more f/u session to be re-assessed on 8/2/19, but she no-showed that session as well. Due to the inability to further her care from non-compliance to our attendance policy and POC, we are discharging the patient from physical therapy at this time. .     We appreciate the kind referral and would willingly work with this patient again, should she be able to arrange regular attendance and is appropriate for further treatment. Your patient's health is our primary concern. If you have any questions/comments please contact us directly at [phone]. Thank you for allowing us to assist in the care of your patient. Therapist Signature: Katelyn Aleman DPT, Cert. MDT, Cert. DN, Cert. SMT Date: 3/6/4165   Certification Period n/a Time: 11:04 AM   Reporting Period n/a     NOTE TO PHYSICIAN:  PLEASE COMPLETE THE ORDERS BELOW AND FAX TO   [facility]: [phone]  If you are unable to process this request in 24 hours please contact our office: [phone]    ___ I have read the above report and request that my patient continue as recommended.   ___ I have read the above report and request that my patient continue therapy with the following changes/special instructions:_________________________________________________________   ___ I have read the above report and request that my patient be discharged from therapy.      Physician Signature: Date:       Time: